# Patient Record
Sex: MALE | Race: WHITE | HISPANIC OR LATINO | URBAN - METROPOLITAN AREA
[De-identification: names, ages, dates, MRNs, and addresses within clinical notes are randomized per-mention and may not be internally consistent; named-entity substitution may affect disease eponyms.]

---

## 2021-07-22 ENCOUNTER — APPOINTMENT (RX ONLY)
Dept: URBAN - METROPOLITAN AREA CLINIC 116 | Facility: CLINIC | Age: 36
Setting detail: DERMATOLOGY
End: 2021-07-22

## 2021-07-22 DIAGNOSIS — D22 MELANOCYTIC NEVI: ICD-10-CM

## 2021-07-22 DIAGNOSIS — D18.0 HEMANGIOMA: ICD-10-CM

## 2021-07-22 DIAGNOSIS — D49.2 NEOPLASM OF UNSPECIFIED BEHAVIOR OF BONE, SOFT TISSUE, AND SKIN: ICD-10-CM

## 2021-07-22 PROBLEM — D22.5 MELANOCYTIC NEVI OF TRUNK: Status: ACTIVE | Noted: 2021-07-22

## 2021-07-22 PROBLEM — D18.01 HEMANGIOMA OF SKIN AND SUBCUTANEOUS TISSUE: Status: ACTIVE | Noted: 2021-07-22

## 2021-07-22 PROCEDURE — 11310 SHAVE SKIN LESION 0.5 CM/<: CPT

## 2021-07-22 PROCEDURE — ? COUNSELING

## 2021-07-22 PROCEDURE — ? SHAVE REMOVAL

## 2021-07-22 PROCEDURE — 99203 OFFICE O/P NEW LOW 30 MIN: CPT | Mod: 25

## 2021-07-22 PROCEDURE — ? ADDITIONAL NOTES

## 2021-07-22 ASSESSMENT — LOCATION DETAILED DESCRIPTION DERM
LOCATION DETAILED: NASAL TIP
LOCATION DETAILED: RIGHT MEDIAL UPPER BACK
LOCATION DETAILED: LEFT INFERIOR UPPER BACK

## 2021-07-22 ASSESSMENT — LOCATION ZONE DERM
LOCATION ZONE: TRUNK
LOCATION ZONE: NOSE

## 2021-07-22 ASSESSMENT — LOCATION SIMPLE DESCRIPTION DERM
LOCATION SIMPLE: NOSE
LOCATION SIMPLE: LEFT UPPER BACK
LOCATION SIMPLE: RIGHT UPPER BACK

## 2021-07-22 NOTE — PROCEDURE: SHAVE REMOVAL
Medical Necessity Information: It is in your best interest to select a reason for this procedure from the list below. All of these items fulfill various CMS LCD requirements except the new and changing color options.
Medical Necessity Clause: This procedure was medically necessary because the lesion that was treated was:
Lab: [facility]
Lab Facility: [address] [address]
Detail Level: Detailed
Was A Bandage Applied: Yes
Size Of Lesion In Cm (Required): 0.4
X Size Of Lesion In Cm (Optional): 0
Biopsy Method: 15 blade
Anesthesia Type: 1% lidocaine with epinephrine
Hemostasis: Drysol
Wound Care: Petrolatum
Render Path Notes In Note?: No
Consent was obtained from the patient. The risks and benefits to therapy were discussed in detail. Specifically, the risks of infection, scarring, bleeding, prolonged wound healing, incomplete removal, allergy to anesthesia, nerve injury and recurrence were addressed. Prior to the procedure, the treatment site was clearly identified and confirmed by the patient. All components of Universal Protocol/PAUSE Rule completed.
Post-Care Instructions: I reviewed with the patient in detail post-care instructions. Patient is to keep the biopsy site dry overnight, and then apply bacitracin twice daily until healed. Patient may apply hydrogen peroxide soaks to remove any crusting.
Notification Instructions: Patient will be notified of pathology results. However, patient instructed to call the office if not contacted within 2 weeks.
Billing Type: [employer]

## 2021-07-22 NOTE — PROCEDURE: ADDITIONAL NOTES
Additional Notes: If malignant consider Mohs surgery.
Detail Level: Simple
Render Risk Assessment In Note?: no

## 2021-07-22 NOTE — PROCEDURE: MIPS QUALITY
Quality 130: Documentation Of Current Medications In The Medical Record: Current Medications Documented
Quality 402: Tobacco Use And Help With Quitting Among Adolescents: Patient screened for tobacco and never smoked
Quality 431: Preventive Care And Screening: Unhealthy Alcohol Use - Screening: Patient screened for unhealthy alcohol use using a single question and scores less than 2 times per year
Detail Level: Detailed
Additional Notes: Patient is not on any medications

## 2025-06-24 ENCOUNTER — OFFICE VISIT (OUTPATIENT)
Dept: URBAN - METROPOLITAN AREA CLINIC 9 | Facility: CLINIC | Age: 40
End: 2025-06-24
Payer: COMMERCIAL

## 2025-06-24 ENCOUNTER — DASHBOARD ENCOUNTERS (OUTPATIENT)
Age: 40
End: 2025-06-24

## 2025-06-24 DIAGNOSIS — R13.10 DYSPHAGIA, UNSPECIFIED TYPE: ICD-10-CM

## 2025-06-24 DIAGNOSIS — R10.10 UPPER ABDOMINAL PAIN: ICD-10-CM

## 2025-06-24 DIAGNOSIS — K21.9 GASTROESOPHAGEAL REFLUX DISEASE, UNSPECIFIED WHETHER ESOPHAGITIS PRESENT: ICD-10-CM

## 2025-06-24 DIAGNOSIS — R19.7 INTERMITTENT DIARRHEA: ICD-10-CM

## 2025-06-24 DIAGNOSIS — R11.2 NAUSEA AND VOMITING, UNSPECIFIED VOMITING TYPE: ICD-10-CM

## 2025-06-24 PROBLEM — 40739000: Status: ACTIVE | Noted: 2025-06-24

## 2025-06-24 PROBLEM — 235595009: Status: ACTIVE | Noted: 2025-06-24

## 2025-06-24 PROCEDURE — 99204 OFFICE O/P NEW MOD 45 MIN: CPT | Performed by: PHYSICIAN ASSISTANT

## 2025-06-24 RX ORDER — PANTOPRAZOLE SODIUM 20 MG/1
TAKE 1 TABLET (ORAL) DAILY FOR 14 DAYS. TAKE 30 MINUTES IN IN THE MORNING PRIOR MEALS TABLET, DELAYED RELEASE ORAL
Qty: 14 EACH | Refills: 0 | Status: ACTIVE | COMMUNITY

## 2025-06-24 RX ORDER — FAMOTIDINE 20 MG/1
TAKE 1 TABLET BY MOUTH EVERY DAY AT BEDTIME FOR 14 DAYS TABLET ORAL
Qty: 14 EACH | Refills: 0 | Status: ACTIVE | COMMUNITY

## 2025-06-24 RX ORDER — METRONIDAZOLE 500 MG/1
TAKE 1 TABLET BY MOUTH THREE TIMES A DAY FOR 5 DAYS TABLET ORAL
Qty: 15 EACH | Refills: 0 | Status: ACTIVE | COMMUNITY

## 2025-06-24 RX ORDER — ONDANSETRON 4 MG/1
TAKE 1 TABLET (ORAL) DAILY (NAUSEA AND VOMITING) FOR 3 DAYS TABLET, ORALLY DISINTEGRATING ORAL
Qty: 6 EACH | Refills: 0 | Status: ACTIVE | COMMUNITY

## 2025-06-24 NOTE — PHYSICAL EXAM CHEST:
chest wall non-tender, breathing is unlabored without accessory muscle use, normal breath sounds Tumor Depth: Less than 6mm from granular layer and no invasion beyond the subcutaneous fat

## 2025-06-24 NOTE — HPI-TODAY'S VISIT:
Patient, new to office, who presents for recent onset of GERD symptoms.  Began to have some upper abdominal pain/cramping (which can be pretty severe), nausea/vomiting and diarrhea (~ 2-3 episodes).  He has had about 3 of these over past few months.  Lasts about a day to so.   No blood in stool, no mucus.   Went to  6/16 - last episode.  Given Mylicon, Protonix,  metronidazole, famotidine, and ondansetron.   Still taking protonix and famotidine.   Sometimes at night has heartburn, no reguritation. Also, some dysphagia with eating fast.  Bowel habitis usually are normal.   Pt denies unintentional weight loss, anorexia, early satiety, fever.  FH:  Dad with pancreatic cancer.  Otherwise.   ETOH: Very little.  Maybe 2 a month.   No prior endoscopies.   No prior labs/imaging of abd.   Patient denies use of blood thinners, hx of CV disease, pacemaker or ICD or other implanted wired device, seizure disorders, YOSSI, use of CPAP, severe pulmonary disease, prior issues with anesthesia.  Will  schedule for an EGD with dilitation, obtain labs, US of abd.  Discussed procedure and prep. Discussed risk/benefits. Patient is agreeable to proceed.  Will arrange.  RTC as advised post procedure.

## 2025-06-24 NOTE — PHYSICAL EXAM CARDIOVASCULAR:
Patient provided with discharge instructions. Verbalized understanding for plan of care at home and follow up. All questions/ concerns addressed prior to discharge.    no edema, no murmurs, regular rate and rhythm

## 2025-06-28 LAB
A/G RATIO: 1.8
ABSOLUTE BASOPHILS: 58
ABSOLUTE EOSINOPHILS: 199
ABSOLUTE LYMPHOCYTES: 2291
ABSOLUTE MONOCYTES: 639
ABSOLUTE NEUTROPHILS: 5113
ALBUMIN: 4.9
ALKALINE PHOSPHATASE: 47
ALT (SGPT): 33
AST (SGOT): 21
BASOPHILS: 0.7
BILIRUBIN, TOTAL: 0.5
BUN/CREATININE RATIO: (no result)
BUN: 17
CALCIUM: 9.8
CARBON DIOXIDE, TOTAL: 27
CHLORIDE: 102
CREATININE: 1.04
EGFR: 94
EOSINOPHILS: 2.4
GLOBULIN, TOTAL: 2.8
GLUCOSE: 95
HEMATOCRIT: 43.5
HEMOGLOBIN: 14.7
LIPASE: 83
LYMPHOCYTES: 27.6
MCH: 28.4
MCHC: 33.8
MCV: 84.1
MONOCYTES: 7.7
MPV: 9.3
NEUTROPHILS: 61.6
PLATELET COUNT: 285
POTASSIUM: 4.4
PROTEIN, TOTAL: 7.7
RDW: 14
RED BLOOD CELL COUNT: 5.17
SODIUM: 137
WHITE BLOOD CELL COUNT: 8.3

## 2025-06-30 ENCOUNTER — OFFICE VISIT (OUTPATIENT)
Dept: URBAN - METROPOLITAN AREA SURGERY CENTER 9 | Facility: SURGERY CENTER | Age: 40
End: 2025-06-30

## 2025-06-30 RX ORDER — FAMOTIDINE 20 MG/1
TAKE 1 TABLET BY MOUTH EVERY DAY AT BEDTIME FOR 14 DAYS TABLET ORAL
Qty: 14 EACH | Refills: 0 | Status: ACTIVE | COMMUNITY

## 2025-06-30 RX ORDER — PANTOPRAZOLE SODIUM 20 MG/1
TAKE 1 TABLET (ORAL) DAILY FOR 14 DAYS. TAKE 30 MINUTES IN IN THE MORNING PRIOR MEALS TABLET, DELAYED RELEASE ORAL
Qty: 14 EACH | Refills: 0 | Status: ACTIVE | COMMUNITY

## 2025-06-30 RX ORDER — ONDANSETRON 4 MG/1
TAKE 1 TABLET (ORAL) DAILY (NAUSEA AND VOMITING) FOR 3 DAYS TABLET, ORALLY DISINTEGRATING ORAL
Qty: 6 EACH | Refills: 0 | Status: ACTIVE | COMMUNITY

## 2025-06-30 RX ORDER — METRONIDAZOLE 500 MG/1
TAKE 1 TABLET BY MOUTH THREE TIMES A DAY FOR 5 DAYS TABLET ORAL
Qty: 15 EACH | Refills: 0 | Status: ACTIVE | COMMUNITY

## 2025-07-06 ENCOUNTER — TELEPHONE ENCOUNTER (OUTPATIENT)
Dept: URBAN - METROPOLITAN AREA CLINIC 9 | Facility: CLINIC | Age: 40
End: 2025-07-06

## 2025-07-08 ENCOUNTER — TELEPHONE ENCOUNTER (OUTPATIENT)
Dept: URBAN - METROPOLITAN AREA CLINIC 9 | Facility: CLINIC | Age: 40
End: 2025-07-08

## 2025-07-30 ENCOUNTER — LAB OUTSIDE AN ENCOUNTER (OUTPATIENT)
Dept: URBAN - METROPOLITAN AREA CLINIC 9 | Facility: CLINIC | Age: 40
End: 2025-07-30

## 2025-07-30 ENCOUNTER — OFFICE VISIT (OUTPATIENT)
Dept: URBAN - METROPOLITAN AREA CLINIC 9 | Facility: CLINIC | Age: 40
End: 2025-07-30
Payer: COMMERCIAL

## 2025-07-30 DIAGNOSIS — K76.0 HEPATIC STEATOSIS: ICD-10-CM

## 2025-07-30 DIAGNOSIS — R11.2 NAUSEA AND VOMITING, UNSPECIFIED VOMITING TYPE: ICD-10-CM

## 2025-07-30 DIAGNOSIS — R10.10 UPPER ABDOMINAL PAIN: ICD-10-CM

## 2025-07-30 DIAGNOSIS — K21.9 GASTROESOPHAGEAL REFLUX DISEASE, UNSPECIFIED WHETHER ESOPHAGITIS PRESENT: ICD-10-CM

## 2025-07-30 DIAGNOSIS — R13.10 DYSPHAGIA, UNSPECIFIED TYPE: ICD-10-CM

## 2025-07-30 DIAGNOSIS — K82.8 GALLBLADDER SLUDGE: ICD-10-CM

## 2025-07-30 PROCEDURE — 99214 OFFICE O/P EST MOD 30 MIN: CPT | Performed by: PHYSICIAN ASSISTANT

## 2025-07-30 RX ORDER — METRONIDAZOLE 500 MG/1
TAKE 1 TABLET BY MOUTH THREE TIMES A DAY FOR 5 DAYS TABLET ORAL
Qty: 15 EACH | Refills: 0 | Status: ON HOLD | COMMUNITY

## 2025-07-30 RX ORDER — ONDANSETRON 4 MG/1
TAKE 1 TABLET (ORAL) DAILY (NAUSEA AND VOMITING) FOR 3 DAYS TABLET, ORALLY DISINTEGRATING ORAL
Qty: 6 EACH | Refills: 0 | Status: ON HOLD | COMMUNITY

## 2025-07-30 RX ORDER — PANTOPRAZOLE SODIUM 20 MG/1
TAKE 1 TABLET (ORAL) DAILY FOR 14 DAYS. TAKE 30 MINUTES IN IN THE MORNING PRIOR MEALS TABLET, DELAYED RELEASE ORAL
Qty: 14 EACH | Refills: 0 | Status: ON HOLD | COMMUNITY

## 2025-07-30 RX ORDER — FAMOTIDINE 20 MG/1
TAKE 1 TABLET BY MOUTH EVERY DAY AT BEDTIME FOR 14 DAYS TABLET ORAL
Qty: 14 EACH | Refills: 0 | Status: ON HOLD | COMMUNITY

## 2025-07-30 NOTE — HPI-TODAY'S VISIT:
Patient, new to office, who presents for recent onset of GERD symptoms.  Began to have some upper abdominal pain/cramping (which can be pretty severe), nausea/vomiting and diarrhea (~ 2-3 episodes).  He has had about 3 of these over past few months.  Lasts about a day to so.   No blood in stool, no mucus.   Went to  6/16 - last episode.  Given Mylicon, Protonix,  metronidazole, famotidine, and ondansetron.   Still taking protonix and famotidine.   Sometimes at night has heartburn, no reguritation. Also, some dysphagia with eating fast.  Bowel habitis usually are normal.   Pt denies unintentional weight loss, anorexia, early satiety, fever.  FH:  Dad with pancreatic cancer.  Otherwise.   ETOH: Very little.  Maybe 2 a month.   6/2025 - Lipase sl up at 83.  CBC, CMP nl.  6/2025 - US -  Will  schedule for an EGD with dilitation, obtain labs, US of abd.  Discussed procedure and prep. Discussed risk/benefits. Patient is agreeable to proceed.  Will arrange.  RTC as advised post procedure.  Interim visit 7/30/25 Here for f/u on EGD.  Just some reflux on eso bx noted.  Will place him back on PPI for 6-8 weeks, then switch to pepcid 20mg q hs.  He is feeling better by following anti-reflux precations.  Recheck lipase level in about 2 months,  If up, then follow with CT pancreas.  US with fatty liver and gallbladder sludge.  Will refer to general surgeon to discuss as well as obtain a fibroscan.  RTC prn